# Patient Record
Sex: MALE | Race: WHITE
[De-identification: names, ages, dates, MRNs, and addresses within clinical notes are randomized per-mention and may not be internally consistent; named-entity substitution may affect disease eponyms.]

---

## 2019-03-14 ENCOUNTER — HOSPITAL ENCOUNTER (EMERGENCY)
Dept: HOSPITAL 92 - ERS | Age: 59
Discharge: HOME | End: 2019-03-14
Payer: SELF-PAY

## 2019-03-14 DIAGNOSIS — F42.9: ICD-10-CM

## 2019-03-14 DIAGNOSIS — R51: Primary | ICD-10-CM

## 2019-03-14 DIAGNOSIS — F31.9: ICD-10-CM

## 2019-03-14 DIAGNOSIS — F17.210: ICD-10-CM

## 2019-03-14 DIAGNOSIS — J45.909: ICD-10-CM

## 2019-03-14 DIAGNOSIS — I10: ICD-10-CM

## 2019-03-14 DIAGNOSIS — F41.9: ICD-10-CM

## 2019-03-14 PROCEDURE — 96365 THER/PROPH/DIAG IV INF INIT: CPT

## 2019-03-14 PROCEDURE — 96375 TX/PRO/DX INJ NEW DRUG ADDON: CPT

## 2019-03-14 PROCEDURE — 96367 TX/PROPH/DG ADDL SEQ IV INF: CPT

## 2019-03-14 PROCEDURE — 70450 CT HEAD/BRAIN W/O DYE: CPT

## 2019-03-14 NOTE — CT
CT BRAIN WITHOUT CONTRAST

3/14/19

 

HISTORY: 

Severe headache. 

 

FINDINGS:  

No evidence of infarct, hemorrhage, midline shift, or abnormal extra-axial fluid collections seen. Th
e ventricular size is normal and the basilar cisterns patent. Increased density in the vessels and si
nuses is noted likely due to increased hematocrit. The bony calvarium is intact. There is mild mucosa
l disease in the paranasal sinuses. Mastoid air cells are clear. 

 

IMPRESSION:  

No CT evidence of acute intracranial process. 

 

This study is interpreted in consultation with Dr. Scotty Brennan (neuroradiologist) who concurs. 

 

POS: OFF

## 2020-10-27 ENCOUNTER — HOSPITAL ENCOUNTER (INPATIENT)
Dept: HOSPITAL 92 - ERS | Age: 60
LOS: 2 days | Discharge: HOME | DRG: 433 | End: 2020-10-29
Attending: INTERNAL MEDICINE | Admitting: STUDENT IN AN ORGANIZED HEALTH CARE EDUCATION/TRAINING PROGRAM
Payer: SELF-PAY

## 2020-10-27 VITALS — BODY MASS INDEX: 32.4 KG/M2

## 2020-10-27 DIAGNOSIS — F42.9: ICD-10-CM

## 2020-10-27 DIAGNOSIS — E83.39: ICD-10-CM

## 2020-10-27 DIAGNOSIS — E83.42: ICD-10-CM

## 2020-10-27 DIAGNOSIS — E66.9: ICD-10-CM

## 2020-10-27 DIAGNOSIS — Z88.5: ICD-10-CM

## 2020-10-27 DIAGNOSIS — F32.9: ICD-10-CM

## 2020-10-27 DIAGNOSIS — F17.210: ICD-10-CM

## 2020-10-27 DIAGNOSIS — Z79.899: ICD-10-CM

## 2020-10-27 DIAGNOSIS — K70.10: Primary | ICD-10-CM

## 2020-10-27 DIAGNOSIS — G40.909: ICD-10-CM

## 2020-10-27 DIAGNOSIS — F10.20: ICD-10-CM

## 2020-10-27 DIAGNOSIS — E86.0: ICD-10-CM

## 2020-10-27 DIAGNOSIS — E87.2: ICD-10-CM

## 2020-10-27 DIAGNOSIS — F41.9: ICD-10-CM

## 2020-10-27 DIAGNOSIS — E78.6: ICD-10-CM

## 2020-10-27 DIAGNOSIS — I10: ICD-10-CM

## 2020-10-27 DIAGNOSIS — Z20.828: ICD-10-CM

## 2020-10-27 LAB
ALBUMIN SERPL BCG-MCNC: 4.5 G/DL (ref 3.4–4.8)
ALP SERPL-CCNC: 60 U/L (ref 40–110)
ALT SERPL W P-5'-P-CCNC: 197 U/L (ref 8–55)
ANION GAP SERPL CALC-SCNC: 19 MMOL/L (ref 10–20)
APTT PPP: 27.6 SEC (ref 22.9–36.1)
AST SERPL-CCNC: 136 U/L (ref 5–34)
BASOPHILS # BLD AUTO: 0.1 THOU/UL (ref 0–0.2)
BASOPHILS NFR BLD AUTO: 1.1 % (ref 0–1)
BILIRUB SERPL-MCNC: 0.4 MG/DL (ref 0.2–1.2)
BUN SERPL-MCNC: 7 MG/DL (ref 8.4–25.7)
CALCIUM SERPL-MCNC: 7.9 MG/DL (ref 7.8–10.44)
CALCIUM SERPL-MCNC: 8.8 MG/DL (ref 7.8–10.44)
CHLORIDE SERPL-SCNC: 102 MMOL/L (ref 98–107)
CO2 SERPL-SCNC: 23 MMOL/L (ref 23–31)
CREAT CL PREDICTED SERPL C-G-VRATE: 0 ML/MIN (ref 70–130)
EOSINOPHIL # BLD AUTO: 0.1 THOU/UL (ref 0–0.7)
EOSINOPHIL NFR BLD AUTO: 2.1 % (ref 0–10)
GLOBULIN SER CALC-MCNC: 2.8 G/DL (ref 2.4–3.5)
GLUCOSE SERPL-MCNC: 138 MG/DL (ref 80–115)
HBCM INDEX: 0.05 S/CO (ref 0–0.79)
HBSAG INDEX: 0.17 S/CO (ref 0–0.99)
HEP A IGM S/CO: 0.11 S/CO (ref 0–0.79)
HEP C INDEX: 0.04 S/CO (ref 0–0.79)
HGB BLD-MCNC: 16.7 G/DL (ref 14–18)
INR PPP: 1
LIPASE SERPL-CCNC: 126 U/L (ref 8–78)
LYMPHOCYTES # BLD: 1.4 THOU/UL (ref 1.2–3.4)
LYMPHOCYTES NFR BLD AUTO: 22.7 % (ref 21–51)
MCH RBC QN AUTO: 32.8 PG (ref 27–31)
MCV RBC AUTO: 94.3 FL (ref 78–98)
MONOCYTES # BLD AUTO: 0.5 THOU/UL (ref 0.11–0.59)
MONOCYTES NFR BLD AUTO: 8.2 % (ref 0–10)
NEUTROPHILS # BLD AUTO: 4.1 THOU/UL (ref 1.4–6.5)
NEUTROPHILS NFR BLD AUTO: 65.9 % (ref 42–75)
PLATELET # BLD AUTO: 280 THOU/UL (ref 130–400)
POTASSIUM SERPL-SCNC: 4.3 MMOL/L (ref 3.5–5.1)
PROT UR STRIP.AUTO-MCNC: 10 MG/DL
PROTHROMBIN TIME: 13.8 SEC (ref 12–14.7)
RBC # BLD AUTO: 5.1 MILL/UL (ref 4.7–6.1)
SODIUM SERPL-SCNC: 140 MMOL/L (ref 136–145)
SP GR UR STRIP: 1.05 (ref 1–1.04)
TSH SERPL DL<=0.005 MIU/L-ACNC: 0.61 UIU/ML (ref 0.35–4.94)
WBC # BLD AUTO: 6.2 THOU/UL (ref 4.8–10.8)

## 2020-10-27 PROCEDURE — 84443 ASSAY THYROID STIM HORMONE: CPT

## 2020-10-27 PROCEDURE — 51701 INSERT BLADDER CATHETER: CPT

## 2020-10-27 PROCEDURE — 80048 BASIC METABOLIC PNL TOTAL CA: CPT

## 2020-10-27 PROCEDURE — 96376 TX/PRO/DX INJ SAME DRUG ADON: CPT

## 2020-10-27 PROCEDURE — 83605 ASSAY OF LACTIC ACID: CPT

## 2020-10-27 PROCEDURE — U0003 INFECTIOUS AGENT DETECTION BY NUCLEIC ACID (DNA OR RNA); SEVERE ACUTE RESPIRATORY SYNDROME CORONAVIRUS 2 (SARS-COV-2) (CORONAVIRUS DISEASE [COVID-19]), AMPLIFIED PROBE TECHNIQUE, MAKING USE OF HIGH THROUGHPUT TECHNOLOGIES AS DESCRIBED BY CMS-2020-01-R: HCPCS

## 2020-10-27 PROCEDURE — 84425 ASSAY OF VITAMIN B-1: CPT

## 2020-10-27 PROCEDURE — 85730 THROMBOPLASTIN TIME PARTIAL: CPT

## 2020-10-27 PROCEDURE — 96365 THER/PROPH/DIAG IV INF INIT: CPT

## 2020-10-27 PROCEDURE — 81003 URINALYSIS AUTO W/O SCOPE: CPT

## 2020-10-27 PROCEDURE — 74178 CT ABD&PLV WO CNTR FLWD CNTR: CPT

## 2020-10-27 PROCEDURE — 36415 COLL VENOUS BLD VENIPUNCTURE: CPT

## 2020-10-27 PROCEDURE — 96372 THER/PROPH/DIAG INJ SC/IM: CPT

## 2020-10-27 PROCEDURE — 87040 BLOOD CULTURE FOR BACTERIA: CPT

## 2020-10-27 PROCEDURE — G0008 ADMIN INFLUENZA VIRUS VAC: HCPCS

## 2020-10-27 PROCEDURE — 84100 ASSAY OF PHOSPHORUS: CPT

## 2020-10-27 PROCEDURE — 96375 TX/PRO/DX INJ NEW DRUG ADDON: CPT

## 2020-10-27 PROCEDURE — 87635 SARS-COV-2 COVID-19 AMP PRB: CPT

## 2020-10-27 PROCEDURE — 80053 COMPREHEN METABOLIC PANEL: CPT

## 2020-10-27 PROCEDURE — 36416 COLLJ CAPILLARY BLOOD SPEC: CPT

## 2020-10-27 PROCEDURE — 76705 ECHO EXAM OF ABDOMEN: CPT

## 2020-10-27 PROCEDURE — 85610 PROTHROMBIN TIME: CPT

## 2020-10-27 PROCEDURE — 80074 ACUTE HEPATITIS PANEL: CPT

## 2020-10-27 PROCEDURE — 83690 ASSAY OF LIPASE: CPT

## 2020-10-27 PROCEDURE — 90662 IIV NO PRSV INCREASED AG IM: CPT

## 2020-10-27 PROCEDURE — 85025 COMPLETE CBC W/AUTO DIFF WBC: CPT

## 2020-10-27 PROCEDURE — 90471 IMMUNIZATION ADMIN: CPT

## 2020-10-27 PROCEDURE — 93005 ELECTROCARDIOGRAM TRACING: CPT

## 2020-10-27 PROCEDURE — G0378 HOSPITAL OBSERVATION PER HR: HCPCS

## 2020-10-27 PROCEDURE — 83735 ASSAY OF MAGNESIUM: CPT

## 2020-10-27 NOTE — CT
CT ABDOMEN AND PELVIS WITH AND WITHOUT IV CONTRAST:

 

Date:  10/17/2020

 

HISTORY:  

Lower right-sided back pain. 

 

FINDINGS:

The lung bases are unremarkable. Liver demonstrates decreased attenuation compared to the spleen cons
istent with fatty infiltration. No focal mass or abnormal biliary ductal dilatation is seen. No calci
fied gallstones are noted. The spleen, pancreas, adrenal glands, and left kidney are normal. There is
 a 5 mm low density lesion in the left anterior renal cortex, too small to characterize, likely repre
sents a small cyst. No calculi are seen in the kidneys, ureters, or the urinary bladder. No hydrouret
eronephrosis is noted on either side. 

 

No free air, free fluid, or lymphadenopathy is noted in the abdomen or pelvis. A retroaortic left avery
al vein is present. There is no evidence of aneurysmal dilatation of the abdominal aorta. 

 

The small bowel loops are not abnormally dilated. A normal appearing appendix is present. There is a 
small fat-containing left inguinal hernia. There are degenerative changes in the spine. No abnormally
 loculated fluid collection is seen to suggest abscess formation. 

 

IMPRESSION: 

No evidence of acute process. 

 

 

POS: AH

## 2020-10-27 NOTE — PDOC.HHP
Hospitalist HPI





- History of Present Illness


Flank pain


History of Present Illness: 





Mr. Gutiérrez is a 60-year-old male with a past medical history of bipolar disorder,

alcohol abuse disorder, hypertension, seizure who presents for 3 weeks of 

worsening right flank pain.  Patient reports that over the past 3 weeks he has 

been binge drinking increasing his daily 6 pack to a 12 pack.  Patient does 

report a history of seizures although he reports it was not from alcohol 

withdrawal.  Denies any prior hospitalizations for alcohol withdrawal.  Patient 

reports that over the past few days his pain has acutely worsened and is 

associated with nausea, and p.o. intolerance.  Patient reports that eating makes

the pain worse, and that lying in the fetal position makes the pain feel better.

 Patient denies any vomiting, diarrhea, constipation.  He denies any 

hematochezia but does report a few episodes of melena a week ago which is new 

for him.  Patient has had a colonoscopy approximately 1 year ago which was nor

mal.  He denies fever, chills, night sweats.  Denies any numbness or 

paresthesias.  Denies hematuria, dysuria, history of kidney stones.  He denies 

any saddle anesthesia, IV drug use or urinary retention.





In the emergency room initial vital signs 168/89, 85, 18, 98.6, 98% on room air.

 EKG with normal sinus rhythm, no ischemic changes.  H/H 16.7/48.1, WBC 6.2, 

platelet count 280.  BUNs/CR 7/0.82.  Sodium 140, potassium 4.3.  Glucose 138.  

Lactic acid 3.9, AST//197, alk phos 60, T bili 0.4.  Lipase elevated at 

126.  UA positive for ketones but otherwise normal.  CT abdomen pelvis with w

ithout contrast showed no evidence of acute process.  Patient received 3 L of IV

fluid with repeat lactic acid 3.8.  Patient also received 2 g of ceftriaxone, 

fentanyl, Toradol for pain.





Hospitalist ROS





- Review of Systems


Constitutional: denies: fever, chills, sweats, weakness, malaise, other


Eyes: denies: pain, vision change, conjunctivae inflammation, eyelid 

inflammation, redness, other


ENT: denies: ear pain, ear discharge, nose pain, nose discharge, nose 

congestion, mouth pain, mouth swelling, throat pain, throat swelling, other


Respiratory: denies: cough, dry, shortness of breath, hemoptysis, SOB with 

excertion, pleuritic pain, sputum, wheezing, other


Cardiovascular: denies: chest pain, palpitations, orthopnea, paroxysmal noc. 

dyspnea, edema, light headedness, other


Gastrointestinal: reports: nausea, abdominal pain (R flank pain).  denies: 

vomiting, diarrhea, constipation, melena, hematochezia, other


Genitourinary: denies: dysuria, frequency, incontinence, hematuria, retention, 

other


Musculoskeletal: reports: back pain


Skin: denies: rash, lesions, toña, bruising, other


Neurological: denies: weakness, numbness, incoordination, change in speech, 

confusion, seizures, other





- Medication


Medications: 


Home medications include 





Latuda 


Paroxetine


Buspirone


Lisinopril


Seroquel





Patient reports allergy to codeine, Vicodin which he reports he feels hot and 

uncomfortable





Hospitalist History





- Past Medical History


Other Medical History: 





Past medical history of





Bipolar disorder


Alcohol abuse


Hypertension


Seizure











- Family History


Family History: reports: no pertinent history





- Social History


Smoking Status: Current some day smoker


Tobacco Type: cigarettes


Alcohol: reports: Heavy


Drugs: reports: marijuana


Living Situation: With Family


Activity level: independent ambulation





- Exam


General Appearance: NAD, awake alert


Eye: PERRL, anicteric sclera


ENT: normocephalic atraumatic, no oropharyngeal lesions, moist mucosa


Neck: supple, symmetric, no JVD, no thyromegaly, no lymphadenopathy, no carotid 

bruit


Heart: RRR, no murmur, no gallops, no rubs, normal peripheral pulses


Respiratory: CTAB, no wheezes, no rales, no ronchi, normal chest expansion, no 

tachypnea, normal percussion


Gastrointestinal: soft, non-tender, non-distended, normal bowel sounds, no 

palpable masses, no hepatomegaly, no splenomegaly, no bruit


Gastrointestinal - other findings: mild R CVA tenderness


Extremities: no cyanosis, no clubbing, no edema


Skin: normal turgor, no lesions, no rashes


Neurological: cranial nerve grossly intact, normal sensation to touch, no 

weakness, no focal deficits, no new deficit


Musculoskeletal: normal tone, normal strength, no muscle wasting


Psychiatric: normal affect, normal behavior, A&O x 3





Hospitalist Results





- Labs


Result Diagrams: 


                                 10/27/20 11:04





                                 10/27/20 11:04


Lab results: 


                                        











WBC  6.2 thou/uL (4.8-10.8)   10/27/20  11:04    


 


Hgb  16.7 g/dL (14.0-18.0)   10/27/20  11:04    


 


Hct  48.1 % (42.0-52.0)   10/27/20  11:04    


 


MCV  94.3 fL (78.0-98.0)   10/27/20  11:04    


 


Plt Count  280 thou/uL (130-400)   10/27/20  11:04    


 


Neutrophils %  65.9 % (42.0-75.0)   10/27/20  11:04    


 


Sodium  140 mmol/L (136-145)   10/27/20  11:04    


 


Potassium  4.3 mmol/L (3.5-5.1)   10/27/20  11:04    


 


Chloride  102 mmol/L ()   10/27/20  11:04    


 


Carbon Dioxide  23 mmol/L (23-31)   10/27/20  11:04    


 


BUN  7 mg/dL (8.4-25.7)  L  10/27/20  11:04    


 


Creatinine  0.82 mg/dL (0.7-1.3)   10/27/20  11:04    


 


Glucose  138 mg/dL ()  H  10/27/20  11:04    


 


Lactic Acid  3.8 mmol/L (0.5-2.2)  H  10/27/20  13:42    


 


Calcium  8.8 mg/dL (7.8-10.44)   10/27/20  11:04    


 


Total Bilirubin  0.4 mg/dL (0.2-1.2)   10/27/20  11:04    


 


AST  136 U/L (5-34)  H  10/27/20  11:04    


 


ALT  197 U/L (8-55)  H  10/27/20  11:04    


 


Alkaline Phosphatase  60 U/L ()   10/27/20  11:04    


 


Serum Total Protein  7.3 g/dL (5.8-8.1)   10/27/20  11:04    


 


Albumin  4.5 g/dL (3.4-4.8)   10/27/20  11:04    


 


Lipase  126 U/L (8-78)  H  10/27/20  11:04    


 


Urine Ketones  20 mg/dL (Negative)  A  10/27/20  15:12    


 


Urine Blood  Negative  (Negative)   10/27/20  15:12    


 


Urine Nitrite  Negative  (Negative)   10/27/20  15:12    


 


Ur Leukocyte Esterase  Negative Arielle/uL (Negative)   10/27/20  15:12    














Hospitalist H&P A/P





- Plan


Plan: 





R Flank Pain


60M hx of etoh abuse presents with 3 week onset of worsening R flank pain. 

Patient reports that over the past few days he has had worsening nausea and PO 

intolerance. Has not been eating much over the past few weeks as he has been 

binge drinking. Able to tolerate small sips of water, but not able to tolerate 

solid food. In ED CT abdomen pelvis with no acute pathology. No evidence of 

kidney stone, no evidence of UTI. No radiological evidence of pancreatitis. P

atient afebrile with no leukocytosis. Lipase mildly elevated at 126. Given PO 

intolerance and elevated lipase question pancreatitis, although pain is 

localized to R flank. At this time etiology unknown, will obtain RUQUS to rule 

out stone not visible by CT scan. 





Plan


-Clear liquid diet


-Follow urine cx


-IVF @ 150 cc/hr


-RUQUS


-Trend LFTs


-Toradol, fentanyl for pain


-Continue to monitor








Elevated LTFs


LFTs mildly elevated to 136/197. Alk phos 60. Will send for hepatitis panel and 

obtain RUQUS





Plan


-Hepatitis panel


-RUQUS


-Trend


-PT/INR





Lactic Acidosis


Lactic acid elevated to 3.8 on admission. Patient received 3L of NS with repeat 

lactic acid of 3.9, now 4.9. Patient afebrile with no leukocytosis. Not meeting 

SIRS criteria. Patient did received 2g of ceftriaxone in ED. CT 

chest/abdomen/pelvis with no signs of infectious process. Unknown etiology, but 

suspect likely multifactorial including etoh abuse and starvation acidosis. 





Plan


-IVF


-Mg, Ca, Phos


-Trend lactic acid


-Trend fever curve, WBC


-Blood cx





Etoh abuse


Hx of long-term etoh abuse. Pt reports he normally drinks a 6 pack of beer 

nightly. Reports that the past three weeks he has been binge drinking 

approximately twice that much of mostly beer. Patient denies history of prior 

withdrawal or admissions for withdrawal. Does report history of single siezure 

which he beleives might have been secondary to a large amount of chewing tobacco

he accidental consumed. Reports that his brain scans were fine and has not had 

another event. Will place on ASE protocol. Last drink two days prior to 

admission.





Plan


-ASE protocol


-Thiamine, Vitamin B12


-Mg, Folate


-Will use ativan for ASE protocol





Hypertension


Hx of HTN. Will continue home lisinopril once dosage confirmed. 





Bipolar Disorder


Hx of bipolar disorder on home latuda, paroxetine, seroquel, and buspirone. Pt 

currently denies SI/HI. Will continue home medications once dosages confirmed. 





DVT Prophylaxis: Lovenox


FULL CODE





Case discussed with attending physician, Dr. oMrales.

## 2020-10-28 LAB
ANION GAP SERPL CALC-SCNC: 14 MMOL/L (ref 10–20)
BUN SERPL-MCNC: 9 MG/DL (ref 8.4–25.7)
CALCIUM SERPL-MCNC: 7.4 MG/DL (ref 7.8–10.44)
CHLORIDE SERPL-SCNC: 104 MMOL/L (ref 98–107)
CO2 SERPL-SCNC: 23 MMOL/L (ref 23–31)
CREAT CL PREDICTED SERPL C-G-VRATE: 136 ML/MIN (ref 70–130)
GLUCOSE SERPL-MCNC: 106 MG/DL (ref 80–115)
HGB BLD-MCNC: 13.9 G/DL (ref 14–18)
MAGNESIUM SERPL-MCNC: 1.4 MG/DL (ref 1.6–2.6)
MCH RBC QN AUTO: 33.3 PG (ref 27–31)
MCV RBC AUTO: 95.5 FL (ref 78–98)
MDIFF COMPLETE?: YES
PLATELET # BLD AUTO: 224 THOU/UL (ref 130–400)
POTASSIUM SERPL-SCNC: 3.7 MMOL/L (ref 3.5–5.1)
RBC # BLD AUTO: 4.18 MILL/UL (ref 4.7–6.1)
SODIUM SERPL-SCNC: 137 MMOL/L (ref 136–145)
WBC # BLD AUTO: 7.7 THOU/UL (ref 4.8–10.8)

## 2020-10-28 RX ADMIN — MAGNESIUM SULFATE HEPTAHYDRATE SCH MLS: 40 INJECTION, SOLUTION INTRAVENOUS at 11:43

## 2020-10-28 RX ADMIN — MAGNESIUM SULFATE HEPTAHYDRATE SCH MLS: 40 INJECTION, SOLUTION INTRAVENOUS at 12:47

## 2020-10-28 NOTE — PDOC.HOSPP
- Subjective


Encounter Date: 10/28/20


Encounter Time: 16:30


Subjective: 


Patient seen and examined for right-sided flank pain with multiple electrolyte 

abnormalities.  Pain is controlled with current pain regimen.  Patient denies 

any nausea, vomiting or diarrhea.  No chest pain or palpitations. 





- Objective


Vital Signs & Weight: 


                             Vital Signs (12 hours)











  Temp Pulse Resp BP Pulse Ox


 


 10/28/20 15:04  99.2 F  86  16  180/114 H  100


 


 10/28/20 11:08  98.8 F  87  20  173/102 H  97


 


 10/28/20 07:19  99.1 F  91  16  162/100 H  96








                                     Weight











Weight                         207 lb 4.8 oz














I&O: 


                                        











 10/27/20 10/28/20 10/29/20





 06:59 06:59 06:59


 


Intake Total  1500 1960


 


Output Total  1000 1925


 


Balance  500 35











Result Diagrams: 


                                 10/29/20 04:11





                                 10/29/20 04:11


Additional Labs: 


                                   Accuchecks











  10/27/20





  23:52


 


POC Glucose  111 H








                                        


Abnormal Lab Results - Last 48 hrs





10/27/20 11:04: BUN 7 L,  H,  H, Lipase 126 H


10/27/20 11:04: MCH 32.8 H, Basophils % 1.1 H


10/27/20 11:04: Lactic Acid 3.9 H


10/27/20 13:42: Lactic Acid 3.8 H


10/27/20 15:12: Ur Specific Gravity 1.049 H, Urine Ketones 20 A


10/27/20 16:39: Lactic Acid 4.9 H*


10/27/20 16:39: Magnesium 1.5 L


10/27/20 17:55: Phosphorus 2.1 L


10/27/20 21:46: Lactic Acid 2.3 H


10/28/20 04:08: Calcium 7.4 L


10/28/20 04:08: RBC 4.18 L, Hgb 13.9 L, Hct 39.9 L, MCH 33.3 H, Band Neuts % (

Manual) 2 L, Monocytes % (Manual) 14 H


10/28/20 04:09: Phosphorus 2.0 L, Magnesium 1.4 L





Microbiology - Entire Visit





10/27/20 17:54   Venous blood - Left Hand   Blood Culture - Preliminary


                            Specimen has been received and culture in progress.


                            No Growth to date.


10/27/20 17:49   Venous blood - Right Arm   Blood Culture - Preliminary


                            Specimen has been received and culture in progress.


                            No Growth to date.








Radiology Reviewed by me: Yes (CT abdomennegative for acute pathology)


EKG Reviewed by me: Yes (Sinus rhythm on telemetry)





Hospitalist ROS





- Review of Systems


Cardiovascular: denies: chest pain, palpitations, orthopnea, paroxysmal noc. 

dyspnea, edema, light headedness, other


Genitourinary: denies: dysuria, frequency, incontinence, hematuria, retention, 

other





- Medication


Medications: 


Active Medications











Generic Name Dose Route Start Last Admin





  Trade Name Freq  PRN Reason Stop Dose Admin


 


Fentanyl  25 mcg  10/27/20 17:56  10/28/20 09:58





  Fentanyl 100 Mcg/2 Ml Vial  SLOW IVP   25 mcg





  Q2H PRN   Administration





  Severe Pain (7-10)  


 


Lorazepam  1 mg  10/27/20 17:39  10/28/20 02:23





  Lorazepam 1 Mg Tab  PO   1 mg





  Q4H PRN   Administration





  ASE >=9  


 


Phosphorus  500 mg  10/28/20 12:00  10/28/20 16:24





  K-Phos Neutral 250 Mg Tab  PO   500 mg





  TID-WM MITCH   Administration














- Exam


General - other findings: In mild distress due to right-sided pain


Heart: RRR, no gallops, no rubs, normal peripheral pulses


Respiratory: no wheezes, no rales, no ronchi, normal chest expansion


Gastrointestinal: soft, normal bowel sounds, no guarding, no rigidity


Gastrointestinal - other findings: Right-sided flank tendernessprobably 

musculoskeletal


Neurological: no new deficit


Psychiatric: normal affect, A&O x 3





Hosp A/P





- Plan


DVT proph w/SCDs





Generalized weakness


Right-sided flank painprobably musculoskeletal


Chronic alcoholism with alcoholic hepatitis


Lactic acidosis probably due to dehydration


Hypertensionuncontrolled


Bipolar disorder


Hypokalemia/hypomagnesemia/hypophosphatemia


Obesity with a BMI of 32.5





Plan:


Pain controlled on current pain regimen.  Right upper quadrant ultrasound 

negative for pathology.  CT scan of the abdomen negative for acute pathology.  

The pain is probably musculoskeletal.  Lactic acid improving.  His magnesium 

today was 1.4 with a phosphorus of 2.0.  Magnesium and phosphorus will be 

replaced.  Patient is still in significant pain and is requiring IV fentanyl.  

Will wean off the pain medicine later today and probably discharge him in a.m.  

Patient was extensively counseled on alcohol cessation.  Will add thiamine, 

folic acid and multivitamin.  Control blood pressure with clonidine and 

metoprolol which will also help with the alcohol withdrawal.  Restart home dose 

of lisinopril.  Restart all other home medications including BuSpar, Paxil, S

eroquel.

## 2020-10-28 NOTE — ULT
Ultrasound of [facility] upper quadrant:

10/28/2020



COMPARISON:None available



HISTORY:Flank pain, abdominal pain, elevated liver function tests



TECHNIQUE: Multiplanar grayscale sonographic imaging of [facility] upper quadrant



FINDINGS:Hepatic parenchyma is echogenic, suggesting steatosis. Partially obscured pancreas grossly u
nremarkable.



No focal liver lesion. No gallstones or gallbladder wall thickening.



Common bile that measures 3 mm, within normal limits. Right kidney measures 11.6 cm in craniocaudal d
imension and demonstrates no stone, hydronephrosis, or mass. The sonographer reports a negative

Stacy's sign.



IMPRESSION:No acute findings.



Reported By: Nate Estrada 

Electronically Signed:  10/28/2020 7:35 AM

## 2020-10-29 VITALS — SYSTOLIC BLOOD PRESSURE: 155 MMHG | TEMPERATURE: 98.8 F | DIASTOLIC BLOOD PRESSURE: 90 MMHG

## 2020-10-29 LAB
ALBUMIN SERPL BCG-MCNC: 3.3 G/DL (ref 3.4–4.8)
ALP SERPL-CCNC: 42 U/L (ref 40–110)
ALT SERPL W P-5'-P-CCNC: 95 U/L (ref 8–55)
ANION GAP SERPL CALC-SCNC: 11 MMOL/L (ref 10–20)
AST SERPL-CCNC: 62 U/L (ref 5–34)
BASOPHILS # BLD AUTO: 0.1 THOU/UL (ref 0–0.2)
BASOPHILS NFR BLD AUTO: 0.9 % (ref 0–1)
BILIRUB SERPL-MCNC: 0.7 MG/DL (ref 0.2–1.2)
BUN SERPL-MCNC: 6 MG/DL (ref 8.4–25.7)
CALCIUM SERPL-MCNC: 7.6 MG/DL (ref 7.8–10.44)
CHLORIDE SERPL-SCNC: 106 MMOL/L (ref 98–107)
CO2 SERPL-SCNC: 24 MMOL/L (ref 23–31)
CREAT CL PREDICTED SERPL C-G-VRATE: 138 ML/MIN (ref 70–130)
EOSINOPHIL # BLD AUTO: 0.3 THOU/UL (ref 0–0.7)
EOSINOPHIL NFR BLD AUTO: 4.3 % (ref 0–10)
GLOBULIN SER CALC-MCNC: 2.1 G/DL (ref 2.4–3.5)
GLUCOSE SERPL-MCNC: 105 MG/DL (ref 80–115)
HGB BLD-MCNC: 13.5 G/DL (ref 14–18)
LYMPHOCYTES # BLD: 1.6 THOU/UL (ref 1.2–3.4)
LYMPHOCYTES NFR BLD AUTO: 24.8 % (ref 21–51)
MAGNESIUM SERPL-MCNC: 1.8 MG/DL (ref 1.6–2.6)
MCH RBC QN AUTO: 32.6 PG (ref 27–31)
MCV RBC AUTO: 95.4 FL (ref 78–98)
MONOCYTES # BLD AUTO: 0.9 THOU/UL (ref 0.11–0.59)
MONOCYTES NFR BLD AUTO: 14.5 % (ref 0–10)
NEUTROPHILS # BLD AUTO: 3.6 THOU/UL (ref 1.4–6.5)
NEUTROPHILS NFR BLD AUTO: 55.5 % (ref 42–75)
PLATELET # BLD AUTO: 205 THOU/UL (ref 130–400)
POTASSIUM SERPL-SCNC: 3.4 MMOL/L (ref 3.5–5.1)
RBC # BLD AUTO: 4.14 MILL/UL (ref 4.7–6.1)
SODIUM SERPL-SCNC: 138 MMOL/L (ref 136–145)
WBC # BLD AUTO: 6.4 THOU/UL (ref 4.8–10.8)

## 2020-10-30 NOTE — PDOC.DS.DS
Provider





- Provider


Date of Admission: 


10/27/20 18:30





Date of Discharge: 10/29/20


Admitting Provider: 


Marko Morales MD





Consultations: None


Primary Care Physician: 


RENETTA PCP PROVIDER








Course





- Hospital Course


Hospital Course: 





Patient is 60-year-old male who presented to the emergency room with intractable

right-sided low back pain/flank pain that has been worsening over the last 2 

weeks.  Please refer to the history and physical by the physician assistant for 

further details.





The patient was admitted to the telemetry unit with a diagnosis of intractable 

right-sided flank pain with electrolyte abnormalities/lactic acidosis.  His 

maximum lactic acid was 4.9.  Right upper quadrant ultrasound was negative for 

acute findings.  CT scan of the abdomen and pelvis with and without contrast was

negative for acute findings.  He was also diagnosed with mild alcoholic 

hepatitis that improved with IV hydration gradually improved.  He was 

extensively counseled on alcohol cessation.  His right flank pain is 

significantly improved with muscle relaxants.  Fall precautions with 24-hour 

supervision was advised.





Final diagnosis:


Generalized weakness


Right-sided flank painprobably musculoskeletal


Chronic alcoholism with alcoholic hepatitis


Lactic acidosis probably due to dehydration


Hypertensionuncontrolled


Bipolar disorder


Hypokalemia/hypomagnesemia/hypophosphatemia


Obesity with a BMI of 32.5








Resuscitation Status: 








10/27/20 17:40


Resuscitation Status Routine 


   Co-Sign Provider: 


   Resuscitation Status: FULL: Full Resuscitation











- Labs


Lab Results: 


                                        





                                 10/29/20 04:11 





                                 10/29/20 04:11 





Abnormal Lab Results - Last 48 hrs





10/28/20 04:09: Phosphorus 2.0 L, Magnesium 1.4 L


10/29/20 04:11: Potassium 3.4 L, BUN 6 L, Calcium 7.6 L, AST 62 H, ALT 95 H, 

Serum Total Protein 5.4 L, Albumin 3.3 L, Globulin 2.1 L


10/29/20 04:11: RBC 4.14 L, Hgb 13.5 L, Hct 39.5 L, MCH 32.6 H, Monocytes % 14.5

H, Monocytes # 0.9 H





Microbiology - Entire Visit





10/27/20 17:54   Venous blood - Left Hand   Blood Culture - Preliminary


                            NO GROWTH AT 48 HOURS


10/27/20 17:49   Venous blood - Right Arm   Blood Culture - Preliminary


                            NO GROWTH AT 48 HOURS











- Physical Exam


Vitals: 


                                     Weight











Weight                         207 lb 4.8 oz














Physical Exam: 


The patient was seen and examined on the day of discharge.








Plan





- Discharge Medications


Prescriptions: 


Cyclobenzaprine [Flexeril] 5 mg PO TID PRN #7 tab


 PRN Reason: Muscle Spasm


Home Medications: 


                                        











 Medication  Instructions  Recorded  Confirmed  Type


 


Lurasidone HCl [Latuda] 20 mg PO HS 10/27/20 10/27/20 History


 


PARoxetine HCl [Paxil] 20 mg PO DAILY 10/27/20 10/27/20 History


 


PARoxetine HCl [Paxil] 40 mg PO DAILY 10/27/20 10/27/20 History


 


QUEtiapine Fumarate [SEROquel] 25 mg PO HS 10/27/20 10/27/20 History


 


busPIRone HCl [Buspirone HCl] 15 mg PO TID 10/27/20 10/27/20 History


 


Lisinopril 10 mg PO DAILY 10/28/20 10/28/20 History


 


Cyclobenzaprine [Flexeril] 5 mg PO TID PRN #7 tab 10/29/20  Rx











Allergies: 








codeine Allergy (Verified 10/27/20 21:30)


   


morphine Allergy (Verified 10/27/20 21:30)


   








- Follow up Plan


Referrals: 


[facility] [Outside] - 7 Days (call and establish with a 

primary care physician.)


Disposition: HOME





Quality





- Care Measures


CORE MEASURES:: N/A

## 2025-01-10 ENCOUNTER — HOSPITAL ENCOUNTER (EMERGENCY)
Dept: HOSPITAL 92 - ERS | Age: 65
Discharge: HOME | End: 2025-01-10
Payer: COMMERCIAL

## 2025-01-10 DIAGNOSIS — R51.9: Primary | ICD-10-CM

## 2025-01-10 DIAGNOSIS — I10: ICD-10-CM

## 2025-01-10 DIAGNOSIS — F17.210: ICD-10-CM

## 2025-01-10 DIAGNOSIS — F17.220: ICD-10-CM

## 2025-01-10 LAB
ALBUMIN SERPL BCG-MCNC: 4.1 G/DL (ref 3.4–4.8)
ALP SERPL-CCNC: 52 U/L (ref 40–110)
ALT SERPL W P-5'-P-CCNC: 38 U/L (ref 8–55)
ANION GAP SERPL CALC-SCNC: 18 MMOL/L (ref 10–20)
AST SERPL-CCNC: 43 U/L (ref 5–34)
BASOPHILS # BLD AUTO: 0.07 10X3/UL (ref 0–0.2)
BASOPHILS NFR BLD AUTO: 1 % (ref 0–1)
BILIRUB SERPL-MCNC: 0.6 MG/DL (ref 0.2–1.2)
BUN SERPL-MCNC: 4 MG/DL (ref 8.4–25.7)
CALCIUM SERPL-MCNC: 8.3 MG/DL (ref 7.8–10.44)
CHLORIDE SERPL-SCNC: 99 MMOL/L (ref 98–107)
CO2 SERPL-SCNC: 24 MMOL/L (ref 23–31)
CREAT CL PREDICTED SERPL C-G-VRATE: 0 ML/MIN (ref 70–130)
EOSINOPHIL # BLD AUTO: 0.1 10X3/UL (ref 0–0.7)
EOSINOPHIL NFR BLD AUTO: 1.2 % (ref 0–10)
GLOBULIN SER CALC-MCNC: 2.7 G/DL (ref 2.4–3.5)
GLUCOSE SERPL-MCNC: 125 MG/DL (ref 80–115)
HCT VFR BLD CALC: 44.9 % (ref 42–52)
HGB BLD-MCNC: 15.5 G/DL (ref 14–18)
LYMPHOCYTES NFR BLD AUTO: 24.7 % (ref 21–51)
MCH RBC QN AUTO: 31.4 PG (ref 27–31)
MCV RBC AUTO: 91.1 FL (ref 78–98)
MONOCYTES # BLD AUTO: 0.9 10X3/UL (ref 0.11–0.59)
MONOCYTES NFR BLD AUTO: 13.6 % (ref 0–10)
NEUTROPHILS # BLD AUTO: 4 10X3/UL (ref 1.4–6.5)
NEUTROPHILS NFR BLD AUTO: 59.1 % (ref 42–75)
PLATELET # BLD AUTO: 238 10X3/UL (ref 130–400)
POTASSIUM SERPL-SCNC: 4 MMOL/L (ref 3.5–5.1)
RBC # BLD AUTO: 4.93 MILL/UL (ref 4.7–6.1)
SODIUM SERPL-SCNC: 137 MMOL/L (ref 136–145)
WBC # BLD AUTO: 6.8 10X3/UL (ref 4.8–10.8)

## 2025-01-10 PROCEDURE — 80053 COMPREHEN METABOLIC PANEL: CPT

## 2025-01-10 PROCEDURE — 96375 TX/PRO/DX INJ NEW DRUG ADDON: CPT

## 2025-01-10 PROCEDURE — 96374 THER/PROPH/DIAG INJ IV PUSH: CPT

## 2025-01-10 PROCEDURE — 70450 CT HEAD/BRAIN W/O DYE: CPT

## 2025-01-10 PROCEDURE — 36415 COLL VENOUS BLD VENIPUNCTURE: CPT

## 2025-01-10 PROCEDURE — 85025 COMPLETE CBC W/AUTO DIFF WBC: CPT

## 2025-02-07 ENCOUNTER — HOSPITAL ENCOUNTER (EMERGENCY)
Dept: HOSPITAL 92 - ERS | Age: 65
Discharge: HOME | End: 2025-02-07
Payer: SELF-PAY

## 2025-02-07 DIAGNOSIS — I10: ICD-10-CM

## 2025-02-07 DIAGNOSIS — Z79.899: ICD-10-CM

## 2025-02-07 DIAGNOSIS — F17.210: ICD-10-CM

## 2025-02-07 DIAGNOSIS — S01.81XA: Primary | ICD-10-CM
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
## 2025-02-07 DIAGNOSIS — F17.220: ICD-10-CM
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
## 2025-02-07 PROCEDURE — 70450 CT HEAD/BRAIN W/O DYE: CPT

## 2025-02-07 PROCEDURE — 90715 TDAP VACCINE 7 YRS/> IM: CPT

## 2025-02-07 PROCEDURE — 90471 IMMUNIZATION ADMIN: CPT
